# Patient Record
Sex: FEMALE | Race: WHITE
[De-identification: names, ages, dates, MRNs, and addresses within clinical notes are randomized per-mention and may not be internally consistent; named-entity substitution may affect disease eponyms.]

---

## 2018-11-25 ENCOUNTER — HOSPITAL ENCOUNTER (EMERGENCY)
Dept: HOSPITAL 33 - ED | Age: 48
Discharge: HOME | End: 2018-11-25
Payer: COMMERCIAL

## 2018-11-25 VITALS — HEART RATE: 64 BPM | OXYGEN SATURATION: 97 % | DIASTOLIC BLOOD PRESSURE: 92 MMHG | SYSTOLIC BLOOD PRESSURE: 108 MMHG

## 2018-11-25 DIAGNOSIS — G43.909: Primary | ICD-10-CM

## 2018-11-25 DIAGNOSIS — Z79.899: ICD-10-CM

## 2018-11-25 DIAGNOSIS — Z79.01: ICD-10-CM

## 2018-11-25 PROCEDURE — 96375 TX/PRO/DX INJ NEW DRUG ADDON: CPT

## 2018-11-25 PROCEDURE — 96374 THER/PROPH/DIAG INJ IV PUSH: CPT

## 2018-11-25 PROCEDURE — 96372 THER/PROPH/DIAG INJ SC/IM: CPT

## 2018-11-25 PROCEDURE — 70450 CT HEAD/BRAIN W/O DYE: CPT

## 2018-11-25 PROCEDURE — 99284 EMERGENCY DEPT VISIT MOD MDM: CPT

## 2018-11-25 PROCEDURE — 96360 HYDRATION IV INFUSION INIT: CPT

## 2018-11-25 RX ADMIN — METOCLOPRAMIDE ONE MG: 5 INJECTION, SOLUTION INTRAMUSCULAR; INTRAVENOUS at 13:38

## 2018-11-25 RX ADMIN — SUMATRIPTAN SUCCINATE STA MG: 6 INJECTION SUBCUTANEOUS at 13:38

## 2018-11-25 RX ADMIN — DIPHENHYDRAMINE HYDROCHLORIDE ONE MG: 50 INJECTION INTRAMUSCULAR; INTRAVENOUS at 13:37

## 2018-11-25 NOTE — ERPHSYRPT
- History of Present Illness


Time Seen by Provider: 11/25/18 12:06


Source: patient, family


Exam Limitations: no limitations


Patient Subjective Stated Complaint: pt co headache this morning. pt states 

this is her normal migraine,


Triage Nursing Assessment: pt walked in, resp easy, skin w/d/p. alert and 

oriented,


Physician History: 





The patient is a 48-year-old female with her daughter complaining that she woke 

up with a typical migraine headache.  She has pain in both temples and is 

nauseated.  She is sensitive to light.  Imitrex will usually abort the 

headache.  However, Imitrex was not effective this morning.  The patient has a 

history of a brain aneurysm that was discovered 22 years ago.  She denies 

numbness or tingling anywhere.





Her past medical history is significant for brain aneurysm, CAD, MI, cardiac 

pacer/defibrillator, HTN, and high cholesterol.


Timing/Duration: today


Quality: aching


Head Pain Location: temporal


Severity of Pain-Max: moderate


Severity of Pain-Current: moderate


Recent Head Trauma: chronic headaches


Modifying Factors: Improves With: exposure to light


Associated Symptoms: nausea/vomiting, sensitive to light, No loss of 

consciousness, No numbness in legs/feet, No scotoma, No stiff neck, No trouble 

walking, No vision changes, No visual disturbance


Previous symptoms: same symptoms as today


Allergies/Adverse Reactions: 








tramadol Allergy (Verified 11/25/18 11:53)


 





Home Medications: 








Aspirin [Aspirin EC] 81 mg PO DAILY 02/27/16 [History]


Carvedilol 12.5 mg*** [Coreg 12.5 mg***] 12.5 mg PO BID 02/27/16 [History]


Clopidogrel Bisulfate [Plavix] 75 mg PO DAILY 02/27/16 [History]


Esomeprazole Magnesium [Nexium] 40 mg PO DAILY 02/27/16 [History]


Lisinopril 10 mg*** [Zestril 10 MG***] 20 mg PO DAILY 02/27/16 [History]


Nitroglycerin 0.4 mg Tablet*** [Nitrostat 0.4 MG Tablet***] 0.4 mg SL UD 02/27/ 16 [History]


Sertraline HCl 100 mg [Zoloft 100 MG] 100 mg PO HS 02/27/16 [History]


Atorvastatin Calcium [Lipitor 40Mg] 40 mg PO HS 08/17/16 [History]


Lansoprazole 15 mg DAILY 11/25/18 [History]


Spironolactone 25 mg DAILY 11/25/18 [History]





Hx Tetanus, Diphtheria Vaccination/Date Given: Yes (unknown)


Hx Influenza Vaccination/Date Given: No


Hx Pneumococcal Vaccination/Date Given: No





- Review of Systems


Constitutional: No Fever, No Chills


Eyes: No Symptoms


Ears, Nose, & Throat: No Symptoms


Respiratory: No Cough, No Dyspnea


Cardiac: No Chest Pain, No Edema, No Syncope


Abdominal/Gastrointestinal: Nausea


Genitourinary Symptoms: No Dysuria


Musculoskeletal: No Back Pain, No Neck Pain


Skin: No Rash


Neurological: Headache


Psychological: No Symptoms


Endocrine: No Symptoms


Hematologic/Lymphatic: No Symptoms


Immunological/Allergic: No Symptoms


All Other Systems: Reviewed and Negative





- Past Medical History


Pertinent Past Medical History: Yes


Neurological History: Other


Cardiac History: Congestive Heart Failure, Coronary Artery Disease, High 

Cholesterol, Hypertension, Myocardial Infarction (MI)


Respiratory History: CHF, COPD, Other


Other Medical History: head bleed a week after delivery





- Past Surgical History


Past Surgical History: Yes


Cardiac: Cardiac Catheterization, Cardiac Stent, Internal Defibrillator, 

Pacemaker


Female Surgical History: Hysterectomy





- Social History


Smoking Status: Current every day smoker


How long have you smoked: 20


Exposure to second hand smoke: Yes


Drug Use: none


Patient Lives Alone: No





- Female History


Hx Last Menstrual Period: hyster


Hx Pregnant Now: No





- Nursing Vital Signs


Nursing Vital Signs: 


 Initial Vital Signs











Temperature  98.0 F   11/25/18 11:45


 


Pulse Rate  87   11/25/18 11:45


 


Respiratory Rate  18   11/25/18 11:45


 


Blood Pressure  145/101   11/25/18 11:45


 


O2 Sat by Pulse Oximetry  98   11/25/18 11:45








 Pain Scale











Pain Intensity                 10

















- Physical Exam


General Appearance: mild distress


Eye Exam: PERRL/EOMI


Ears, Nose, Throat Exam: normal ENT inspection, moist mucous membranes


Neck Exam: normal inspection, supple, full range of motion, No meningismus


Respiratory Exam: normal breath sounds, lungs clear


Cardiovascular Exam: regular rate/rhythm, normal heart sounds


Gastrointestinal/Abdominal Exam: soft, No tenderness, No distention


Back Exam: normal inspection, normal range of motion


Extremity Exam: normal inspection


Mental Status Exam: alert, oriented x 3, cooperative


CNs Exam: normal hearing, normal speech, PERRL, tongue midline, No abnormal 

speech, No facial asymmetry, No facial droop, No facial paresthesias, No facial 

weakness


Coordination/Gait Exam: normal cerebellar function


Motor/Sensory Exam: no motor deficit, no sensory deficit


Skin Exam: normal color, warm, dry, No rash


**SpO2 Interpretation**: normal


SpO2: 98


Oxygen Delivery: Room Air





- CT Exams


  ** Head


CT Interpretation: Tele-radiologist Report (per Dr Mcgrath), No/Intracranial 

Hemorrhag


Ordered Tests: 


 Active Orders 24 hr











 Category Date Time Status


 


 IV Insertion STAT Care  11/25/18 12:10 Active


 


 HEAD WITHOUT CONTRAST [CT] Stat Exams  11/25/18 12:09 Taken








Medication Summary














Discontinued Medications














Generic Name Dose Route Start Last Admin





  Trade Name Freq  PRN Reason Stop Dose Admin


 


Diphenhydramine HCl  25 mg  11/25/18 13:28  11/25/18 13:37





  Benadryl 50 Mg/Ml***  IV  11/25/18 13:29  50 mg





  STAT ONE   Administration





     





     





     





     


 


Diphenhydramine HCl  Confirm  11/25/18 13:32  





  Benadryl 50 Mg/Ml***  Administered  11/25/18 13:33  





  Dose   





  50 mg   





  .ROUTE   





  .STK-MED ONE   





     





     





     





     


 


Sodium Chloride  1,000 mls @ 999 mls/hr  11/25/18 12:10  11/25/18 12:47





  Sodium Chloride 0.9% 1000 Ml  IV  11/25/18 13:10  999 mls/hr





  .Q1H1M STA   Administration





     





     





     





     


 


Sodium Chloride  Confirm  11/25/18 12:17  





  Sodium Chloride 0.9% 1000 Ml  Administered  11/25/18 12:18  





  Dose   





  1,000 mls @ ud   





  .ROUTE   





  .STK-MED ONE   





     





     





     





     


 


Metoclopramide HCl  10 mg  11/25/18 13:28  11/25/18 13:38





  Reglan 10 Mg/2 Ml***  IV  11/25/18 13:29  10 mg





  STAT ONE   Administration





     





     





     





     


 


Metoclopramide HCl  Confirm  11/25/18 13:32  





  Reglan 10 Mg/2 Ml***  Administered  11/25/18 13:33  





  Dose   





  10 mg   





  .ROUTE   





  .STK-MED ONE   





     





     





     





     


 


Sumatriptan Succinate  6 mg  11/25/18 13:29  11/25/18 13:38





  Imitrex 6 Mg/0.5 Ml***  SQ  11/25/18 13:30  6 mg





  STAT STA   Administration





     





     





     





     


 


Sumatriptan Succinate  Confirm  11/25/18 13:32  





  Imitrex 6 Mg/0.5 Ml***  Administered  11/25/18 13:33  





  Dose   





  6 mg   





  SQ   





  .STK-MED ONE   





     





     





     





     














- Progress


Progress: improved


Progress Note: 





11/25/18 14:01


The patient was given Benadryl 25 mg, Reglan 10 mg, and fluids by IV.  The 

patient was also given Imitrex 6 mg subcutaneous.  The patient is feeling 

better and wishes to go home.


Blood Culture(s) Obtained: No


Antibiotics given: No


Counseled pt/family regarding: lab results, diagnosis, rad results





- Departure


Time of Disposition: 14:01


Departure Disposition: Home


Clinical Impression: 


 Migraine headache





Condition: Stable


Critical Care Time: No


Referrals: 


AZEEM SALAS PA [Primary Care Provider] - 


Additional Instructions: 


You had a migraine headache this morning.  You were given Reglan 10 mg, 

Benadryl 25 mg, and fluids by IV area you were also given Imitrex 6 mg 

subcutaneous.  The head CT was negative.  Follow-up with your primary medical 

doctor as needed.

## 2018-11-25 NOTE — XRAY
Indication: Migraine headaches.



Multiple contiguous axial images obtained through the head without contrast.



Comparison: None



Normal appearing brain parenchyma, ventricles, and bony calvarium.  Visualized

paranasal sinuses and mastoid air cells are clear.



Impression: Normal CT head without contrast exam.



Comment: Preliminary interpretation was made by [facility].  No discrepancy.



CTDI 67.00